# Patient Record
Sex: FEMALE
[De-identification: names, ages, dates, MRNs, and addresses within clinical notes are randomized per-mention and may not be internally consistent; named-entity substitution may affect disease eponyms.]

---

## 2022-08-03 ENCOUNTER — NON-APPOINTMENT (OUTPATIENT)
Age: 57
End: 2022-08-03

## 2022-08-18 ENCOUNTER — NON-APPOINTMENT (OUTPATIENT)
Age: 57
End: 2022-08-18

## 2022-08-23 ENCOUNTER — NON-APPOINTMENT (OUTPATIENT)
Age: 57
End: 2022-08-23

## 2023-02-02 ENCOUNTER — NON-APPOINTMENT (OUTPATIENT)
Age: 58
End: 2023-02-02

## 2023-04-10 ENCOUNTER — APPOINTMENT (OUTPATIENT)
Dept: ORTHOPEDIC SURGERY | Facility: CLINIC | Age: 58
End: 2023-04-10
Payer: COMMERCIAL

## 2023-04-10 ENCOUNTER — NON-APPOINTMENT (OUTPATIENT)
Age: 58
End: 2023-04-10

## 2023-04-10 PROCEDURE — 99203 OFFICE O/P NEW LOW 30 MIN: CPT

## 2023-04-10 PROCEDURE — 73140 X-RAY EXAM OF FINGER(S): CPT

## 2023-04-10 RX ORDER — AMOXICILLIN AND CLAVULANATE POTASSIUM 875; 125 MG/1; MG/1
875-125 TABLET, COATED ORAL
Qty: 20 | Refills: 0 | Status: ACTIVE | COMMUNITY
Start: 2023-04-10 | End: 1900-01-01

## 2023-04-11 NOTE — DISCUSSION/SUMMARY
[FreeTextEntry1] : The underlying pathophysiology was reviewed with the patient. XR films were reviewed with the patient. Discussed at length the nature of the patient’s condition. 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 \par \par At this time, we discussed treatment options of operative and nonoperative management. I recommended in office cauterization with silver nitrate. The patient is in agreement and she tolerated the procedure well. Additionally, I recommended a course of oral antibiotics, Augmentin, 10 days. Finally, if the mass does not resolve with conservative management, I ultimately would recommend excision of the mass. \par \par RX: Augmentin 875mg, BID (10 days).\par \par All questions answered, understanding verbalized. Patient in agreement with plan of care. Follow up in 1 week, if needed.

## 2023-04-11 NOTE — HISTORY OF PRESENT ILLNESS
[Right] : right hand dominant [FreeTextEntry1] : Pt is a 58 y/o female with mass on the radial side of her left ring finger DIP.  She states that she noticed it 4-5 months ago.  It is sensitive to touch.  She went to her dermatologist who cauterized it twice.  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  She advised her to be seen by a hand surgeon.

## 2023-04-11 NOTE — END OF VISIT
[FreeTextEntry3] : All medical record entries made by the Scribe were at my,  Dr. Yaniv Riggins MD., direction and personally dictated by me on 04/10/2023. I have personally reviewed the chart and agree that the record accurately reflects my personal performance of the history, physical exam, assessment and plan.

## 2023-04-11 NOTE — ADDENDUM
[FreeTextEntry1] : I, Shena Andrade wrote this note acting as a scribe for Dr. Yaniv Riggins on Apr 10, 2023.

## 2023-04-17 ENCOUNTER — APPOINTMENT (OUTPATIENT)
Dept: ORTHOPEDIC SURGERY | Facility: CLINIC | Age: 58
End: 2023-04-17
Payer: COMMERCIAL

## 2023-04-17 DIAGNOSIS — R22.32 LOCALIZED SWELLING, MASS AND LUMP, LEFT UPPER LIMB: ICD-10-CM

## 2023-04-17 PROCEDURE — 99213 OFFICE O/P EST LOW 20 MIN: CPT

## 2023-04-17 NOTE — END OF VISIT
[FreeTextEntry3] : All medical record entries made by the Scribe were at my,  Dr. Yaniv Riggins MD., direction and personally dictated by me on 04/17/2023. I have personally reviewed the chart and agree that the record accurately reflects my personal performance of the history, physical exam, assessment and plan.

## 2023-04-17 NOTE — PHYSICAL EXAM
[de-identified] : Patient is WDWN, alert, and in no acute distress. Breathing is unlabored. She is grossly oriented to person, place, and time.\par \par She is accompanied by her daughter today.\par \par Left Hand (Ring Finger):\par There is a small mass noted to the DIP joint, radially.\par She has full flexion and extension at the DIP joint of the left ring finger.\par Full PIP and MCP joint motion.\par Full digital motion otherwise.\par Sensation is intact to the digits distally. [de-identified] : no new imaging today

## 2023-04-17 NOTE — DISCUSSION/SUMMARY
[FreeTextEntry1] : The underlying pathophysiology was reviewed with the patient. XR films were reviewed with the patient. Discussed at length the nature of the patient’s condition. 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 \par \par At this time, given the persistence of the mass and lack of resolution with various treatment modalities, I recommended operative management of surgical excision. I recommended she continue with Augmentin as prescribed but she was advised to take a probiotic with it to hopefully stave off GI issues, that she has been experiencing. \par \par The patient wishes to proceed with surgical excision of LEFT ring finger mass at this time. The risks and benefits were reviewed with the patient. All of her questions were answered. She will meet with our surgical scheduler and be scheduled for surgery in the near future, when it is convenient for her.

## 2023-04-17 NOTE — ADDENDUM
[FreeTextEntry1] : I, Shena Andrade wrote this note acting as a scribe for Dr. Yaniv Riggins on Apr 17, 2023.

## 2023-06-25 ENCOUNTER — TRANSCRIPTION ENCOUNTER (OUTPATIENT)
Age: 58
End: 2023-06-25

## 2024-10-07 ENCOUNTER — NON-APPOINTMENT (OUTPATIENT)
Age: 59
End: 2024-10-07

## 2025-06-23 NOTE — HISTORY OF PRESENT ILLNESS
[Right] : right hand dominant [FreeTextEntry1] : Pt is a 58 y/o female with mass on the radial side of her left ring finger DIP, since December 2022.  She states that she noticed it 4-5 months ago.  It is sensitive to touch.  She went to her dermatologist who cauterized it twice.  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  She advised her to be seen by a hand surgeon. She seen in the office initially on 4/10/23 at which time the mass was again cauterized and she was placed on a 10 day course of Augmentin 875mg. She returns in reassessment on 4/17/23 and notes she believes the mass has flattened out however it has overall increased in size. She is in progress of Augmentin but reports Gi side effects due to the antibiotic. She notes she had to stop taking it after about 4 days however as she had to go to work. declines